# Patient Record
Sex: FEMALE | Race: BLACK OR AFRICAN AMERICAN | NOT HISPANIC OR LATINO | Employment: OTHER | URBAN - METROPOLITAN AREA
[De-identification: names, ages, dates, MRNs, and addresses within clinical notes are randomized per-mention and may not be internally consistent; named-entity substitution may affect disease eponyms.]

---

## 2020-06-01 ENCOUNTER — HOSPITAL ENCOUNTER (EMERGENCY)
Facility: HOSPITAL | Age: 55
Discharge: HOME | End: 2020-06-01
Attending: EMERGENCY MEDICINE
Payer: MEDICAID

## 2020-06-01 VITALS
HEIGHT: 66 IN | BODY MASS INDEX: 22.66 KG/M2 | SYSTOLIC BLOOD PRESSURE: 135 MMHG | HEART RATE: 90 BPM | OXYGEN SATURATION: 98 % | WEIGHT: 141 LBS | TEMPERATURE: 98 F | RESPIRATION RATE: 18 BRPM | DIASTOLIC BLOOD PRESSURE: 77 MMHG

## 2020-06-01 DIAGNOSIS — M25.551 RIGHT HIP PAIN: Primary | ICD-10-CM

## 2020-06-01 LAB
BACTERIA GENITAL QL WET PREP: ABNORMAL
BILIRUB UR QL STRIP: NEGATIVE
CLARITY UR: CLEAR
CLUE CELLS VAG QL WET PREP: ABNORMAL
COLOR UR: NORMAL
FILAMENT FUNGI VAG WET PREP-#/AREA: ABNORMAL
GLUCOSE UR QL STRIP: NEGATIVE
HGB UR QL STRIP: NEGATIVE
KETONES UR QL STRIP: NEGATIVE
LEUKOCYTE ESTERASE UR QL STRIP: NEGATIVE
NITRITE UR QL STRIP: NEGATIVE
PH UR STRIP: 5 [PH] (ref 5–8)
PROT UR QL STRIP: NEGATIVE
SP GR UR STRIP: 1.01 (ref 1–1.03)
SPECIMEN SOURCE: ABNORMAL
T VAGINALIS GENITAL QL WET PREP: ABNORMAL
URN SPEC COLLECT METH UR: NORMAL
UROBILINOGEN UR STRIP-ACNC: NEGATIVE EU/DL
WBC #/AREA VAG WET PREP: ABNORMAL
YEAST GENITAL QL WET PREP: ABNORMAL

## 2020-06-01 PROCEDURE — 81003 URINALYSIS AUTO W/O SCOPE: CPT

## 2020-06-01 PROCEDURE — 63600175 PHARM REV CODE 636 W HCPCS: Performed by: PHYSICIAN ASSISTANT

## 2020-06-01 PROCEDURE — 96372 THER/PROPH/DIAG INJ SC/IM: CPT

## 2020-06-01 PROCEDURE — 87491 CHLMYD TRACH DNA AMP PROBE: CPT

## 2020-06-01 PROCEDURE — 99284 EMERGENCY DEPT VISIT MOD MDM: CPT | Mod: 25

## 2020-06-01 PROCEDURE — 87210 SMEAR WET MOUNT SALINE/INK: CPT

## 2020-06-01 PROCEDURE — 25000003 PHARM REV CODE 250: Performed by: PHYSICIAN ASSISTANT

## 2020-06-01 RX ORDER — KETOROLAC TROMETHAMINE 30 MG/ML
15 INJECTION, SOLUTION INTRAMUSCULAR; INTRAVENOUS
Status: COMPLETED | OUTPATIENT
Start: 2020-06-01 | End: 2020-06-01

## 2020-06-01 RX ORDER — NAPROXEN 500 MG/1
500 TABLET ORAL 2 TIMES DAILY WITH MEALS
Qty: 10 TABLET | Refills: 0 | Status: SHIPPED | OUTPATIENT
Start: 2020-06-01 | End: 2021-08-30

## 2020-06-01 RX ORDER — FLUCONAZOLE 150 MG/1
150 TABLET ORAL
Status: COMPLETED | OUTPATIENT
Start: 2020-06-01 | End: 2020-06-01

## 2020-06-01 RX ORDER — LIDOCAINE 50 MG/G
1 PATCH TOPICAL DAILY
Qty: 15 PATCH | Refills: 0 | Status: SHIPPED | OUTPATIENT
Start: 2020-06-01 | End: 2023-01-31 | Stop reason: ALTCHOICE

## 2020-06-01 RX ADMIN — KETOROLAC TROMETHAMINE 15 MG: 30 INJECTION, SOLUTION INTRAMUSCULAR at 05:06

## 2020-06-01 RX ADMIN — FLUCONAZOLE 150 MG: 150 TABLET ORAL at 06:06

## 2020-06-01 NOTE — DISCHARGE INSTRUCTIONS
Apply lidocaine patch to area of pain for 12 hr and remove for 12 hr; only 1 patch in 24 hr.  Take naproxen with meals twice daily for 5 days.  Apply an ice pack over the injured area for 15 to 20 minutes every 3 to 6 hours. You should do this for the first 24 to 48 hours. You can make an ice pack by filling a plastic bag that seals at the top with ice cubes and then wrapping it with a thin towel. Be careful not to injure your skin with the ice treatments. Ice should never be applied directly to skin. Continue the use of ice packs for relief of pain and swelling as needed. After 48 hours, apply heat (warm shower or warm bath) for 15 to 20 minutes several times a day, or alternate ice and heat.  Follow-up with your PCP for further evaluation if symptoms persist.  Return to the ED for any concerning symptoms.

## 2020-06-01 NOTE — ED PROVIDER NOTES
Encounter Date: 6/1/2020    SCRIBE #1 NOTE: I, Markso Springer, am scribing for, and in the presence of,  Cici Jarrett PA-C. I have scribed the following portions of the note - Other sections scribed: HPI, ROS, PE.       History     Chief Complaint   Patient presents with    Hip Pain     States she has left hip pain, also burning with urination and odor to urine     CC: Hip Pain    HPI: This 53 y/o female with PSHx hysterectomy presents to ED c/o 9/10 L hip pain shooting down the L leg for past 5 days.  Pt reports she recently moved down to [place] from [place], [place] and had to move around heavy furniture. She also notes driving the distance. She states her hip pains typically come on whenever she stands/sits for long periods of time.  She also c/o burning vaginal pain with associated brown vaginal discharge, increased urinary frequency for past 3-4 days.  She attempted treatment by using douche x2 with improvement of vaginal pain though it is still persistent.  No other medical interventions.  She denies any leg swelling or calf pain.  No dysuria, vaginal itching.  No recent unprotected sexual intercourse.  No abd pain, constipation, N/V/D.  No CP, SOB.    The history is provided by the patient and medical records. No  was used.     Review of patient's allergies indicates:   Allergen Reactions    Mustard      Past Medical History:   Diagnosis Date    Asthma     Bronchitis     COPD (chronic obstructive pulmonary disease)      Past Surgical History:   Procedure Laterality Date    BUNIONECTOMY Right     HYSTERECTOMY  1991    SHOULDER SURGERY Right      History reviewed. No pertinent family history.  Social History     Tobacco Use    Smoking status: Current Some Day Smoker     Types: Cigarettes    Smokeless tobacco: Never Used   Substance Use Topics    Alcohol use: Yes    Drug use: Yes     Types: Marijuana     Comment: less than 4 times per month     Review of Systems    Constitutional: Negative for fever.   HENT: Negative for sore throat.    Eyes: Negative for visual disturbance.   Respiratory: Negative for shortness of breath.    Cardiovascular: Negative for chest pain and leg swelling.   Gastrointestinal: Negative for abdominal pain, constipation, diarrhea, nausea and vomiting.   Genitourinary: Positive for frequency (increased), vaginal discharge and vaginal pain. Negative for dysuria.   Musculoskeletal: Positive for arthralgias. Negative for back pain.   Skin: Negative for rash.   Neurological: Negative for headaches.       Physical Exam     Initial Vitals [06/01/20 1416]   BP Pulse Resp Temp SpO2   137/75 93 18 98.2 °F (36.8 °C) 98 %      MAP       --         Physical Exam    Nursing note and vitals reviewed.  Constitutional: She appears well-developed and well-nourished. She is not diaphoretic. No distress.   HENT:   Head: Normocephalic and atraumatic.   Nose: Nose normal.   Eyes: Conjunctivae and EOM are normal. Pupils are equal, round, and reactive to light.   Neck: Normal range of motion. Neck supple.   Cardiovascular: Normal rate, regular rhythm, normal heart sounds and intact distal pulses.   Pulmonary/Chest: Breath sounds normal. No respiratory distress.   Abdominal: Soft. Bowel sounds are normal. She exhibits no distension. There is no tenderness. There is no rebound.   Genitourinary:   Genitourinary Comments: Pelvic exam (chaperoned by Magdalena Mcgarry RN): normal external genitalia. Normal vaginal rugae with scant amount of thin, white discharge. No CMT.   Musculoskeletal: Normal range of motion. She exhibits no edema or tenderness.   No focal hip tenderness or lower leg tenderness.  Full ROM of hip and bilateral lower extremities.  No leg swelling.   Neurological: She is alert and oriented to person, place, and time. She has normal strength. No cranial nerve deficit or sensory deficit.   Sensations intact.   Skin: Skin is warm and dry. Capillary refill takes  less than 2 seconds.   Psychiatric: She has a normal mood and affect.         ED Course   Procedures  Labs Reviewed   VAGINAL SCREEN - Abnormal; Notable for the following components:       Result Value    WBC - Vaginal Screen Rare (*)     Bacteria - Vaginal Screen Occasional (*)     All other components within normal limits   C. TRACHOMATIS/N. GONORRHOEAE BY AMP DNA   URINALYSIS, REFLEX TO URINE CULTURE    Narrative:     Preferred Collection Type->Urine, Clean Catch          Imaging Results    None          Medical Decision Making:   Clinical Tests:   Lab Tests: Ordered and Reviewed       APC / Resident Notes:   Patient is a 54-year-old American female with a PMH of COPD who presents to the ED for urgent evaluation of right hip pain with radiation to the right lower leg as well as vaginal discharge and urinary frequency.  Patient reports recently driving from [place], [place] to [place] to move back here.  She has been sleeping on the floor until her mattress gets delivered.    Patient is afebrile and well-appearing. Vital signs are stable. On physical exam, there is no focal tenderness to the RLE or lower back. She has FROM of BLE's. No leg swelling or calf tenderness. Pelvic exam demonstrates a small amt of thin, white discharge. No CMT. No abdominal tenderness. Neuro exam non-focal.    UA is without evidence of infection. Wet prep without significant abnormality. Given history and exam, will cover for candidiasis with Diflucan 150 mg x 1. Patient given Toradol for hip pain with significant relief. There is low suspicion for DVT as there is no leg swelling or calf tenderness. Patient has no history of DVT. Low suspicion for hip fracture in absence of trauma. Patient given Rx for Lidocaine patches and Naproxen for treatment of hip pain. She was also advised ice and heat compresses. Advised f/u with her PCP. ED return precautions given. Patient verbalized understanding and is agreeable with plan.        Scribe  Attestation:   Scribe #1: I performed the above scribed service and the documentation accurately describes the services I performed. I attest to the accuracy of the note.                          Clinical Impression:       ICD-10-CM ICD-9-CM   1. Right hip pain M25.551 719.45       I, Cici Jarrett, personally performed the services described in this documentation.  All medical record entries made by the scribe were at my direction and in my presence.  I have reviewed the chart and agree that the record reflects my personal performance and is accurate and complete.  Disposition:   Disposition: Discharged  Condition: Stable     ED Disposition Condition    Discharge Stable        ED Prescriptions     Medication Sig Dispense Start Date End Date Auth. Provider    lidocaine (LIDODERM) 5 % Place 1 patch onto the skin once daily. Remove & Discard patch within 12 hours or as directed by MD 15 patch 6/1/2020  Cici Jarrett PA-C    naproxen (NAPROSYN) 500 MG tablet Take 1 tablet (500 mg total) by mouth 2 (two) times daily with meals. 10 tablet 6/1/2020  Cici Jarrett PA-C        Follow-up Information     Follow up With Specialties Details Why Contact Info    [facility]  Schedule an appointment as soon as possible for a visit   [address] [place] [address]  [phone]                                       Cici Jarrett PA-C  06/02/20 0099

## 2020-06-01 NOTE — ED TRIAGE NOTES
"Pt. Reports left hip pain that "feels like electric shocks" radiating down the leg x 4 days. Pt. Reports only sleep helps pain. Pt. Also reports burning and "brownish" discharge x 3 days. No NAD noted.  "

## 2020-06-02 LAB
C TRACH DNA SPEC QL NAA+PROBE: NOT DETECTED
N GONORRHOEA DNA SPEC QL NAA+PROBE: NOT DETECTED

## 2021-08-30 PROBLEM — B18.2 CHRONIC HEPATITIS C WITHOUT HEPATIC COMA: Status: ACTIVE | Noted: 2021-08-30

## 2022-06-28 ENCOUNTER — NURSE TRIAGE (OUTPATIENT)
Dept: ADMINISTRATIVE | Facility: CLINIC | Age: 57
End: 2022-06-28
Payer: MEDICAID

## 2022-06-28 NOTE — TELEPHONE ENCOUNTER
Reason for Disposition   SEVERE headache, sudden-onset (i.e., reaching maximum intensity within seconds to 1 hour)   Blurred vision and new or worsening    Additional Information   Negative: Difficult to awaken or acting confused (e.g., disoriented, slurred speech)   Negative: Weakness of the face, arm or leg on one side of the body and new-onset   Negative: Numbness of the face, arm or leg on one side of the body and new-onset   Negative: Loss of speech or garbled speech and new-onset   Negative: Passed out (i.e., fainted, collapsed and was not responding)   Negative: Sounds like a life-threatening emergency to the triager   Negative: Followed a head injury within last 3 days   Negative: Traumatic Brain Injury (TBI) is suspected   Negative: Sinus pain of forehead and yellow or green nasal discharge   Negative: Pregnant   Negative: Unable to walk without falling   Negative: Stiff neck (can't touch chin to chest)   Negative: Possibility of carbon monoxide exposure   Negative: SEVERE headache, states 'worst headache' of life   Negative: Followed an eye injury   Negative: Eye pain from chemical in the eye   Negative: Eye pain from foreign body in eye   Negative: Has sinus pain or pressure   Negative: Severe eye pain   Negative: Complete loss of vision in one or both eyes   Negative: Eyelids are very swollen (shut or almost) and fever   Negative: Eyelid (outer) is very red and fever   Negative: Foreign body sensation ('feels like something is in there') and irrigation didn't help   Negative: Vomiting   Negative: Ulcer or sore seen on the cornea (clear center part of the eye)   Negative: Recent eye surgery and increasing eye pain    Protocols used: HEADACHE-A-OH, EYE PAIN-A-OH    Pt stated she has a severe headache and severe rt eye pain with blurred vision in the right eye. Advised to have someone bring to the ED now for evaluation. Pt verbalized understanding.

## 2023-01-05 ENCOUNTER — PATIENT OUTREACH (OUTPATIENT)
Dept: ADMINISTRATIVE | Facility: OTHER | Age: 58
End: 2023-01-05
Payer: MEDICAID

## 2023-01-05 PROBLEM — C53.9 CERVICAL CANCER: Status: ACTIVE | Noted: 2023-01-05

## 2023-01-05 NOTE — PROGRESS NOTES
CHW - Initial Contact    This Community Health Worker completed the Social Determinant of Health questionnaire with patient during clinic visit today.    Pt identified barriers of most importance are: Pt states she needs help finding food lopez near her and help with her gas and electric bill.    Referrals to community agencies completed with patient consent outside of [facility] include:  None made outside of [facility]  Referrals were put through [facility] - yes: Utility Assistance, Family Shelter, and Low-Income Home Energy Assistance Program (LIHEAP)  Support and Services: Utility Assistance   Other information discussed the patient needs / wants help with: Pt also needs help finding a place where she could find warm clothing.    Follow up required: yes  Follow-up Outreach - Due: 1/6/2023

## 2023-01-06 ENCOUNTER — PATIENT OUTREACH (OUTPATIENT)
Dept: ADMINISTRATIVE | Facility: OTHER | Age: 58
End: 2023-01-06
Payer: MEDICAID

## 2023-01-06 NOTE — PROGRESS NOTES
CHW - Follow Up    This Community Health Worker completed a follow up visit with patient via telephone today.  Community Health Worker provided: CHW provided pt with resources for clothing and food, including operating hours and locations.  Food:   Food Pant[facility] by [employer] - HOURS: Mon & Thurs 9:30 am-11:30 am - Location: [address] [place] [address] - HOURS: Mon-Fri 8:00 am-5:00 pm - Location: [address] [place] - Phone: [phone]  Material Center and Food Bank by [facility] - HOURS: Mon-Fri 8:00 am-4:00 pm - Location: [address] [place] - Phone: [phone]    Clothing:  Clothing Closet by [employer] - HOURS: Mon-Thurs 9:00 am-4:00 pm - Phone: [phone]  [facility]oset by [employer] - HOURS: Fri 8:00 am-5:00 pm - Location: [address] [facility]   Family Store by [facility] - HOURS: Mon-Fri 8:00 am-5:00 pm - Location: [address] [facility] - Phone: [phone]    Follow up required: yes   Follow-up Outreach - Due: 1/26/2023

## 2023-01-11 PROBLEM — Z01.00 ROUTINE EYE EXAM: Status: ACTIVE | Noted: 2023-01-11

## 2023-01-11 PROBLEM — Z12.11 COLON CANCER SCREENING: Status: ACTIVE | Noted: 2023-01-11

## 2023-01-11 PROBLEM — K06.8 PAIN IN GUMS: Status: ACTIVE | Noted: 2023-01-11

## 2023-01-11 PROBLEM — F32.A CHRONIC DEPRESSION: Status: ACTIVE | Noted: 2023-01-11

## 2023-01-11 PROBLEM — H61.23 BILATERAL IMPACTED CERUMEN: Status: ACTIVE | Noted: 2023-01-11

## 2023-01-11 PROBLEM — Z12.31 ENCOUNTER FOR MAMMOGRAM TO ESTABLISH BASELINE MAMMOGRAM: Status: ACTIVE | Noted: 2023-01-11

## 2023-01-11 PROBLEM — Z86.19 HX OF HEPATITIS C: Status: ACTIVE | Noted: 2023-01-11

## 2023-01-11 PROBLEM — Z97.2 ILL-FITTING DENTURES: Status: ACTIVE | Noted: 2023-01-11

## 2023-01-11 PROBLEM — Z88.9 HX OF SEASONAL ALLERGIES: Status: ACTIVE | Noted: 2023-01-11

## 2023-01-11 PROBLEM — J44.9 CHRONIC OBSTRUCTIVE PULMONARY DISEASE: Status: ACTIVE | Noted: 2023-01-11

## 2023-01-11 PROBLEM — Z23 NEED FOR TDAP VACCINATION: Status: ACTIVE | Noted: 2023-01-11

## 2023-01-11 PROBLEM — F20.9 SCHIZOPHRENIA: Status: ACTIVE | Noted: 2023-01-11

## 2023-01-11 PROBLEM — Z76.89 ENCOUNTER TO ESTABLISH CARE: Status: ACTIVE | Noted: 2023-01-11

## 2023-01-11 PROBLEM — M79.18 MUSCULOSKELETAL PAIN: Status: ACTIVE | Noted: 2023-01-11

## 2023-01-11 PROBLEM — Z23 PNEUMOCOCCAL VACCINATION ADMINISTERED AT CURRENT VISIT: Status: ACTIVE | Noted: 2023-01-11

## 2023-01-11 PROBLEM — J45.909 ASTHMA: Status: ACTIVE | Noted: 2023-01-11

## 2023-01-11 PROBLEM — Z00.00 HEALTHCARE MAINTENANCE: Status: ACTIVE | Noted: 2023-01-11

## 2023-01-11 PROBLEM — K08.89 ILL-FITTING DENTURES: Status: ACTIVE | Noted: 2023-01-11

## 2023-01-25 ENCOUNTER — PATIENT OUTREACH (OUTPATIENT)
Dept: ADMINISTRATIVE | Facility: OTHER | Age: 58
End: 2023-01-25
Payer: MEDICAID

## 2023-01-25 NOTE — PROGRESS NOTES
CHW - Follow Up    This Community Health Worker completed a follow up visit with patient via telephone today.  Pt reported: pt stated she has used the resources given by CHW; she also stated she would call around for her gas and electric bill   Follow up required: yes  Follow-up Outreach - Due: 2/7/2023

## 2023-01-27 PROBLEM — R52 PAIN: Status: ACTIVE | Noted: 2023-01-27

## 2023-01-27 PROBLEM — E78.2 MIXED HYPERLIPIDEMIA: Status: ACTIVE | Noted: 2023-01-27

## 2023-01-27 PROBLEM — Z71.2 ENCOUNTER TO DISCUSS TEST RESULTS: Status: ACTIVE | Noted: 2023-01-27

## 2023-02-07 ENCOUNTER — PATIENT OUTREACH (OUTPATIENT)
Dept: ADMINISTRATIVE | Facility: OTHER | Age: 58
End: 2023-02-07
Payer: MEDICAID

## 2023-02-07 NOTE — PROGRESS NOTES
CHW - Follow Up    This Community Health Worker completed a follow up visit with patient via telephone today.  Pt reported: pt says she no longer needs help with her light bill due to getting a pass towards it, but she does for her gas.   Community Health Worker provided: chw will find list of organizations to help.  Follow up required: yes  Follow-up Outreach - Due: 2/9/2023

## 2023-02-10 ENCOUNTER — PATIENT OUTREACH (OUTPATIENT)
Dept: ADMINISTRATIVE | Facility: OTHER | Age: 58
End: 2023-02-10
Payer: MEDICAID

## 2023-02-10 NOTE — PROGRESS NOTES
CHW - Follow Up    This Community Health Worker completed a follow up visit with patient via telephone today.  Community Health Worker provided: CHW gave pt resource for her gas bill; Gas and Rent Financial Assistance by [facility]   Follow up required: yes  Follow-up Outreach - Due: 3/2/2023

## 2023-03-02 ENCOUNTER — PATIENT OUTREACH (OUTPATIENT)
Dept: ADMINISTRATIVE | Facility: OTHER | Age: 58
End: 2023-03-02
Payer: MEDICAID

## 2023-03-02 NOTE — PROGRESS NOTES
CHW - Case Closure    This Community Health Worker spoke to patient via telephone today.   Pt reported: Pt says she has recently been paying for a medication that had been free for years and she couldn't afford it long term. Pt also states she had went to the [employer] about her bill and received a pledge from the electric company for $417, but was told they did not have the funds to honor it so she backed out. CHW did inform pt that resources are limited and that if she could to go back and get the pledge so that she could have at least one bill taken care of. CHW also offered to give pt another resource, but pt refused.   CHW provided patient with Community Health Worker's contact information and encouraged her to contact this Community Health Worker if additional needs arise.

## 2023-04-17 PROBLEM — Z00.00 HEALTHCARE MAINTENANCE: Status: RESOLVED | Noted: 2023-01-11 | Resolved: 2023-04-17

## 2023-04-18 ENCOUNTER — PATIENT OUTREACH (OUTPATIENT)
Dept: ADMINISTRATIVE | Facility: OTHER | Age: 58
End: 2023-04-18
Payer: MEDICAID

## 2023-04-18 NOTE — PROGRESS NOTES
CHW - Follow Up    This Community Health Worker completed a follow up visit with patient during clinic visit today.  Pt reported: Pt says she needs help with her water bill due to the water company not accepting her voucher.   Follow up required: Yes  Follow-up Outreach - Due: 4/27/2023

## 2023-05-02 PROBLEM — F17.200 SMOKER: Status: ACTIVE | Noted: 2023-05-02

## 2023-05-02 PROBLEM — J32.2 CHRONIC ETHMOIDAL SINUSITIS: Status: ACTIVE | Noted: 2023-05-02

## 2023-05-02 PROBLEM — B35.3 TINEA PEDIS OF BOTH FEET: Status: ACTIVE | Noted: 2023-05-02

## 2023-05-02 PROBLEM — J02.9 SORE THROAT: Status: ACTIVE | Noted: 2023-05-02

## 2023-05-05 ENCOUNTER — PATIENT OUTREACH (OUTPATIENT)
Dept: ADMINISTRATIVE | Facility: OTHER | Age: 58
End: 2023-05-05
Payer: MEDICAID

## 2023-06-21 PROBLEM — K21.9 LPRD (LARYNGOPHARYNGEAL REFLUX DISEASE): Status: ACTIVE | Noted: 2023-06-21

## 2023-06-21 PROBLEM — R13.10 DYSPHAGIA: Status: ACTIVE | Noted: 2023-06-21

## 2023-07-24 ENCOUNTER — PATIENT MESSAGE (OUTPATIENT)
Dept: RESEARCH | Facility: HOSPITAL | Age: 58
End: 2023-07-24
Payer: MEDICAID

## 2023-07-31 ENCOUNTER — PATIENT MESSAGE (OUTPATIENT)
Dept: RESEARCH | Facility: HOSPITAL | Age: 58
End: 2023-07-31
Payer: MEDICAID

## 2023-10-16 PROBLEM — K29.50 CHRONIC ANTRAL GASTRITIS: Status: ACTIVE | Noted: 2023-10-16

## 2023-10-18 ENCOUNTER — CLINICAL SUPPORT (OUTPATIENT)
Dept: SMOKING CESSATION | Facility: CLINIC | Age: 58
End: 2023-10-18
Payer: COMMERCIAL

## 2023-10-18 DIAGNOSIS — F17.200 NICOTINE DEPENDENCE: Primary | ICD-10-CM

## 2023-10-18 PROCEDURE — 99404 PR PREVENT COUNSEL,INDIV,60 MIN: ICD-10-PCS | Mod: S$GLB,,, | Performed by: GENERAL PRACTICE

## 2023-10-18 PROCEDURE — 99404 PREV MED CNSL INDIV APPRX 60: CPT | Mod: S$GLB,,, | Performed by: GENERAL PRACTICE

## 2023-10-18 RX ORDER — DM/P-EPHED/ACETAMINOPH/DOXYLAM 30-7.5/3
2 LIQUID (ML) ORAL
Qty: 96 LOZENGE | Refills: 0 | Status: SHIPPED | OUTPATIENT
Start: 2023-10-18

## 2023-10-18 RX ORDER — IBUPROFEN 200 MG
1 TABLET ORAL DAILY
Qty: 14 PATCH | Refills: 0 | Status: SHIPPED | OUTPATIENT
Start: 2023-10-18

## 2023-11-15 ENCOUNTER — CLINICAL SUPPORT (OUTPATIENT)
Dept: SMOKING CESSATION | Facility: CLINIC | Age: 58
End: 2023-11-15

## 2023-11-15 DIAGNOSIS — F17.200 NICOTINE DEPENDENCE: Primary | ICD-10-CM

## 2023-11-15 PROCEDURE — 99402 PREV MED CNSL INDIV APPRX 30: CPT | Mod: S$GLB,,, | Performed by: GENERAL PRACTICE

## 2023-11-15 PROCEDURE — 99402 PR PREVENT COUNSEL,INDIV,30 MIN: ICD-10-PCS | Mod: S$GLB,,, | Performed by: GENERAL PRACTICE

## 2023-11-15 NOTE — PROGRESS NOTES
Individual Follow-Up Form    11/15/2023    Quit Date: TBD    Clinical Status of Patient: Outpatient    Length of Service: 30 minutes    Continuing Medication: no    Other Medications: none     Target Symptoms: Withdrawal and medication side effects. The following were  rated moderate (3) to severe (4) on TCRS:  Moderate (3): none  Severe (4): none    Comments: Followed up with patient in clinic. Patient is smoking around a half a pack. She is unable to get the NRT medication due to insurance not covering it. We are slowly cutting down.  orientation, client introductions, completion of TCRS (Tobacco Cessation Rating Scale) learned addiction model, cues/triggers, personal reasons for quitting, medications, goals, quit date. The patient will continue with  therapy sessions and medication monitoring by CTTS. Prescribed medication management will be by physician. The patient denies any abnormal behavioral or mental changes at this time.      Diagnosis: F17.210    Next Visit: 2 weeks

## 2023-12-13 ENCOUNTER — CLINICAL SUPPORT (OUTPATIENT)
Dept: SMOKING CESSATION | Facility: CLINIC | Age: 58
End: 2023-12-13

## 2023-12-13 DIAGNOSIS — F17.200 NICOTINE DEPENDENCE: Primary | ICD-10-CM

## 2023-12-13 PROCEDURE — 99402 PREV MED CNSL INDIV APPRX 30: CPT | Mod: S$GLB,,, | Performed by: GENERAL PRACTICE

## 2023-12-13 PROCEDURE — 99402 PR PREVENT COUNSEL,INDIV,30 MIN: ICD-10-PCS | Mod: S$GLB,,, | Performed by: GENERAL PRACTICE

## 2023-12-13 NOTE — PROGRESS NOTES
Individual Follow-Up Form    12/13/2023    Quit Date: TBD    Clinical Status of Patient: Outpatient    Length of Service: 30 minutes    Continuing Medication: no    Other Medications: none     Target Symptoms: Withdrawal and medication side effects. The following were  rated moderate (3) to severe (4) on TCRS:  Moderate (3): none  Severe (4): none    Comments: Followed up with patient. She is down to 6 cigarettes per day. Completion of TCRS (Tobacco Cessation Rating Scale) reviewed strategies, cues, and triggers. Introduced the negative impact of tobacco on health, the health advantages of discontinuing the use of tobacco, time line improved health changes after a quit, withdrawal issues to expect from nicotine and habit, and ways to achieve the goal of a quit. The patient will continue with  therapy sessions and medication monitoring by CTTS. Prescribed medication management will be by physician. The patient denies any abnormal behavioral or mental changes at this time.      Diagnosis: F17.210    Next Visit: 2 weeks

## 2024-01-10 ENCOUNTER — TELEPHONE (OUTPATIENT)
Dept: SMOKING CESSATION | Facility: CLINIC | Age: 59
End: 2024-01-10
Payer: MEDICAID

## 2024-04-29 ENCOUNTER — CLINICAL SUPPORT (OUTPATIENT)
Dept: SMOKING CESSATION | Facility: CLINIC | Age: 59
End: 2024-04-29

## 2024-04-29 DIAGNOSIS — F17.200 NICOTINE DEPENDENCE: Primary | ICD-10-CM

## 2024-04-29 PROCEDURE — 99999 PR PBB SHADOW E&M-EST. PATIENT-LVL I: CPT | Mod: PBBFAC,,,

## 2024-05-08 ENCOUNTER — TELEPHONE (OUTPATIENT)
Dept: SMOKING CESSATION | Facility: CLINIC | Age: 59
End: 2024-05-08
Payer: MEDICAID

## 2024-05-09 ENCOUNTER — TELEPHONE (OUTPATIENT)
Dept: SMOKING CESSATION | Facility: CLINIC | Age: 59
End: 2024-05-09
Payer: MEDICAID

## 2024-05-09 NOTE — TELEPHONE ENCOUNTER
Pt showed up for appt.  Pt was told she had tried to be reached twice about r/s.  She was told I would contact her today to r/s.  Tried to call, but phone is a busy off hook signal.

## 2024-10-29 ENCOUNTER — TELEPHONE (OUTPATIENT)
Dept: SMOKING CESSATION | Facility: CLINIC | Age: 59
End: 2024-10-29
Payer: MEDICAID